# Patient Record
Sex: FEMALE | Race: WHITE | NOT HISPANIC OR LATINO | RURAL
[De-identification: names, ages, dates, MRNs, and addresses within clinical notes are randomized per-mention and may not be internally consistent; named-entity substitution may affect disease eponyms.]

---

## 2023-01-29 ENCOUNTER — TRANSCRIBE ORDERS (OUTPATIENT)
Dept: CARDIOLOGY | Facility: CLINIC | Age: 58
End: 2023-01-29

## 2023-01-29 DIAGNOSIS — I25.10 CORONARY ARTERY DISEASE, UNSPECIFIED VESSEL OR LESION TYPE, UNSPECIFIED WHETHER ANGINA PRESENT, UNSPECIFIED WHETHER NATIVE OR TRANSPLANTED HEART: Primary | ICD-10-CM

## 2023-07-26 ENCOUNTER — TELEPHONE (OUTPATIENT)
Dept: CARDIOLOGY | Facility: CLINIC | Age: 58
End: 2023-07-26

## 2023-07-26 NOTE — TELEPHONE ENCOUNTER
Caller: Debbie Guzman    Relationship: Self    Best call back number: [phone]     What is the best time to reach you: ANY    Who are you requesting to speak with (clinical staff, provider,  specific staff member): FRONT STAFF      What was the call regarding: PT IS NEEDING A CC APPT FOR HER COLONOSCOPY    Is it okay if the provider responds through MyChart: NO

## 2023-08-03 ENCOUNTER — OFFICE VISIT (OUTPATIENT)
Dept: CARDIOLOGY | Facility: CLINIC | Age: 58
End: 2023-08-03
Payer: MEDICARE

## 2023-08-03 VITALS
BODY MASS INDEX: 31.18 KG/M2 | OXYGEN SATURATION: 94 % | WEIGHT: 176 LBS | SYSTOLIC BLOOD PRESSURE: 134 MMHG | DIASTOLIC BLOOD PRESSURE: 68 MMHG | HEIGHT: 63 IN | HEART RATE: 77 BPM

## 2023-08-03 DIAGNOSIS — R06.02 SHORTNESS OF BREATH: ICD-10-CM

## 2023-08-03 DIAGNOSIS — G47.33 OBSTRUCTIVE SLEEP APNEA: Primary | ICD-10-CM

## 2023-08-03 DIAGNOSIS — I25.10 MILD CAD: ICD-10-CM

## 2023-08-03 RX ORDER — CYCLOBENZAPRINE HCL 10 MG
10 TABLET ORAL DAILY
COMMUNITY
Start: 2023-07-13

## 2023-08-03 RX ORDER — POLYETHYLENE GLYCOL-3350 AND ELECTROLYTES 236; 6.74; 5.86; 2.97; 22.74 G/274.31G; G/274.31G; G/274.31G; G/274.31G; G/274.31G
POWDER, FOR SOLUTION ORAL
COMMUNITY
Start: 2023-06-29

## 2023-08-03 RX ORDER — OMEPRAZOLE 40 MG/1
40 CAPSULE, DELAYED RELEASE ORAL DAILY
COMMUNITY
Start: 2023-07-13

## 2023-08-03 RX ORDER — GEMFIBROZIL 600 MG/1
1 TABLET, FILM COATED ORAL DAILY
COMMUNITY
Start: 2023-04-26

## 2023-08-03 RX ORDER — ATORVASTATIN CALCIUM 40 MG/1
1 TABLET, FILM COATED ORAL DAILY
COMMUNITY
Start: 2023-07-08

## 2023-08-03 RX ORDER — TIOTROPIUM BROMIDE 18 UG/1
CAPSULE ORAL; RESPIRATORY (INHALATION)
COMMUNITY
Start: 2023-07-13

## 2023-08-03 RX ORDER — ALBUTEROL SULFATE 90 UG/1
2 AEROSOL, METERED RESPIRATORY (INHALATION)
COMMUNITY

## 2023-08-03 RX ORDER — ASPIRIN 81 MG/1
81 TABLET, CHEWABLE ORAL DAILY
COMMUNITY

## 2023-08-03 RX ORDER — GABAPENTIN 400 MG/1
400 CAPSULE ORAL NIGHTLY
COMMUNITY
Start: 2023-07-25

## 2023-08-03 RX ORDER — TRAZODONE HYDROCHLORIDE 50 MG/1
50 TABLET ORAL NIGHTLY
COMMUNITY
Start: 2023-07-13

## 2023-08-03 RX ORDER — SEMAGLUTIDE 0.68 MG/ML
0.5 INJECTION, SOLUTION SUBCUTANEOUS WEEKLY
COMMUNITY
Start: 2023-07-19

## 2023-08-03 RX ORDER — SERTRALINE HYDROCHLORIDE 100 MG/1
1 TABLET, FILM COATED ORAL DAILY
COMMUNITY
Start: 2023-06-05

## 2023-08-15 ENCOUNTER — OUTSIDE FACILITY SERVICE (OUTPATIENT)
Dept: CARDIOLOGY | Facility: CLINIC | Age: 58
End: 2023-08-15
Payer: MEDICARE

## 2023-08-17 ENCOUNTER — OFFICE VISIT (OUTPATIENT)
Dept: CARDIOLOGY | Facility: CLINIC | Age: 58
End: 2023-08-17
Payer: MEDICARE

## 2023-08-17 VITALS
DIASTOLIC BLOOD PRESSURE: 60 MMHG | OXYGEN SATURATION: 91 % | WEIGHT: 174 LBS | HEIGHT: 63 IN | HEART RATE: 88 BPM | BODY MASS INDEX: 30.83 KG/M2 | SYSTOLIC BLOOD PRESSURE: 124 MMHG

## 2023-08-17 DIAGNOSIS — R93.1 ABNORMAL FINDINGS ON DIAGNOSTIC IMAGING OF HEART AND CORONARY CIRCULATION: ICD-10-CM

## 2023-08-17 DIAGNOSIS — R94.39 ABNORMAL NUCLEAR STRESS TEST: Primary | ICD-10-CM

## 2023-08-17 DIAGNOSIS — I25.10 MILD CAD: ICD-10-CM

## 2023-08-17 DIAGNOSIS — R06.02 SHORTNESS OF BREATH: ICD-10-CM

## 2023-08-17 PROCEDURE — 1160F RVW MEDS BY RX/DR IN RCRD: CPT | Performed by: NURSE PRACTITIONER

## 2023-08-17 PROCEDURE — 1159F MED LIST DOCD IN RCRD: CPT | Performed by: NURSE PRACTITIONER

## 2023-08-17 PROCEDURE — 99214 OFFICE O/P EST MOD 30 MIN: CPT | Performed by: NURSE PRACTITIONER

## 2023-08-17 RX ORDER — DICLOFENAC SODIUM 75 MG/1
TABLET, DELAYED RELEASE ORAL
COMMUNITY
Start: 2023-08-08 | End: 2023-08-17 | Stop reason: ALTCHOICE

## 2023-08-17 NOTE — ASSESSMENT & PLAN NOTE
Nuclear stress test 8/15/2023-evidence of anterior apical moderate-sized area of ischemia.  Intermediate risk study.  Discussed further cardiac testing including coronary CTA and left heart cath.  - Patient would like to proceed with a coronary CTA at this time.

## 2023-08-17 NOTE — ASSESSMENT & PLAN NOTE
Abnormal nuclear stress test  - Coronary CTA for further evaluation  - Continue aspirin and atorvastatin at current doses.

## 2023-08-17 NOTE — PROGRESS NOTES
[facility] Provider Note     Date:   2023   Name: Debbie Guzman  :   1965  PCP: Nicolas Duarte MD    Chief Complaint   Patient presents with    Follow-up       Subjective     History of Present Illness  Debbie Guzman is a 58 y.o. female who presents today for follow-up on recent cardiac testing.  Patient was evaluated on 8/3/2023 for KEZIA and mild CAD.  She was also needing cardiac clearance prior to routine colonoscopy. She has a history of mild CAD with no previous stenting. Last LHC was in  that revealed mild coronary artery atherosclerosis. A nuclear stress test and echocardiogram were ordered at last office visit due to shortness of breath.  Nuclear stress test completed on 8/15/2023 shows evidence of anterior apical moderate-sized area of ischemia, intermediate risk study.  Echocardiogram from 2023 revealed LVEF of 64%.  Mild to moderate concentric hypertrophy and a small (less than 1 cm) pericardial effusion adjacent to the right atrium and right ventricle, there is no evidence of cardiac tamponade.  Patient denies any new symptoms since last office visit.  She denies chest pain, palpitations, dizziness or syncope.  She continues to have shortness of breath.  History of KEZIA with baseline AHI of 24, currently on BiPAP therapy with good control and compliance.  Download was reviewed and interpreted at last office visit.    Cardiac/sleep history  1.  Mild CAD-no previous stenting.  2.  KEZIA with baseline AHI of 24 on BiPAP therapy 22/14 cm  3.  Hyperlipidemia    Nuclear stress test 8/15/2023-evidence of anterior apical moderate-sized area of ischemia.  Intermediate risk study.    Echocardiogram 2023-  ú  Left ventricular systolic function is normal. Calculated left ventricular EF = 64% Left ventricular ejection fraction appears to be 61 - 65%.  ú  Left ventricular wall thickness is consistent with mild to moderate concentric hypertrophy.  ú  Left  ventricular diastolic function is consistent with (grade I) impaired relaxation.  ú  The right ventricular cavity is mildly dilated.  ú  There is a small (<1cm) pericardial effusion adjacent to the right atrium and right ventricle. There is no evidence of cardiac tamponade.      [facility] 8/27/2021-angiographically, the patient has mild coronary artery atherosclerosis.  There is no indication for revascularization.     Reports Denies   Chest Pain [] [x]   Shortness of Air [x] []   Palpitations [] [x]   Edema [] [x]   Dizziness [] [x]   Syncope [] [x]       Allergies   Allergen Reactions    Codeine Unknown - Low Severity       Current Outpatient Medications:     albuterol sulfate  (90 Base) MCG/ACT inhaler, Inhale 2 puffs., Disp: , Rfl:     aspirin 81 MG chewable tablet, Chew 1 tablet Daily., Disp: , Rfl:     atorvastatin (LIPITOR) 40 MG tablet, Take 1 tablet by mouth Daily., Disp: , Rfl:     cyclobenzaprine (FLEXERIL) 10 MG tablet, Take 1 tablet by mouth Daily., Disp: , Rfl:     gabapentin (NEURONTIN) 400 MG capsule, Take 1 capsule by mouth Every Night., Disp: , Rfl:     GaviLyte-G 236 g solution, TAKE 4000 ML BY MOUTH AS DIRECTED FOR 1 DAY, Disp: , Rfl:     gemfibrozil (LOPID) 600 MG tablet, Take 1 tablet by mouth Daily., Disp: , Rfl:     metFORMIN (GLUCOPHAGE) 500 MG tablet, Take 2 tablets by mouth Every 12 (Twelve) Hours., Disp: , Rfl:     omeprazole (priLOSEC) 40 MG capsule, Take 1 capsule by mouth Daily., Disp: , Rfl:     Ozempic, 0.25 or 0.5 MG/DOSE, 2 MG/3ML solution pen-injector, Inject 0.5 mg under the skin into the appropriate area as directed 1 (One) Time Per Week., Disp: , Rfl:     sertraline (ZOLOFT) 100 MG tablet, Take 1 tablet by mouth Daily., Disp: , Rfl:     Spiriva HandiHaler 18 MCG per inhalation capsule, Place  into inhaler and inhale Daily., Disp: , Rfl:     traZODone (DESYREL) 50 MG tablet, Take 1 tablet by mouth Every Night., Disp: , Rfl:     Past Medical History:   Diagnosis  "Date    COPD (chronic obstructive pulmonary disease)     Coronary artery disease     30% mid LAD per cath    Diabetes     Hyperlipidemia     Neuropathy     Sleep apnea     baseline AHI 24      Past Surgical History:   Procedure Laterality Date    BACK SURGERY      CERVICAL FUSION       SECTION      x two    CHOLECYSTECTOMY      HYSTERECTOMY      KNEE ARTHROSCOPY Bilateral     SHOULDER SURGERY Bilateral      History reviewed. No pertinent family history.  Social History     Socioeconomic History    Marital status: Single    Number of children: 2   Tobacco Use    Smoking status: Every Day     Packs/day: 1.00     Years: 30.00     Pack years: 30.00     Types: Cigarettes     Passive exposure: Current    Smokeless tobacco: Never   Vaping Use    Vaping Use: Never used   Substance and Sexual Activity    Alcohol use: Never    Drug use: Never       Objective     Vital Signs:  /60   Pulse 88   Ht 160 cm (63\")   Wt 78.9 kg (174 lb)   SpO2 91%   BMI 30.82 kg/mý   Estimated body mass index is 30.82 kg/mý as calculated from the following:    Height as of this encounter: 160 cm (63\").    Weight as of this encounter: 78.9 kg (174 lb).               Physical Exam  Vitals reviewed.   Constitutional:       Appearance: Normal appearance.   HENT:      Head: Normocephalic.   Cardiovascular:      Rate and Rhythm: Normal rate and regular rhythm.      Heart sounds: Normal heart sounds.   Pulmonary:      Effort: Pulmonary effort is normal.      Breath sounds: Normal breath sounds.   Musculoskeletal:      Right lower leg: No edema.      Left lower leg: No edema.   Skin:     General: Skin is warm and dry.      Capillary Refill: Capillary refill takes less than 2 seconds.   Neurological:      General: No focal deficit present.      Mental Status: She is alert and oriented to person, place, and time.   Psychiatric:         Mood and Affect: Mood normal.         Behavior: Behavior normal. "         Cardiology studies reviewed: Nuclear stress test and echocardiogram reviewed.          Assessment and Plan     Diagnoses and all orders for this visit:    1. Abnormal nuclear stress test (Primary)  Assessment & Plan:  Nuclear stress test 8/15/2023-evidence of anterior apical moderate-sized area of ischemia.  Intermediate risk study.  Discussed further cardiac testing including coronary CTA and left heart cath.  - Patient would like to proceed with a coronary CTA at this time.    Orders:  -     CT Coronary FFR CTA Data RADHA & GNRJ Estimated FFR Model; Future    2. Shortness of breath  Assessment & Plan:  Likely multifactorial, patient is a smoker and has mild CAD.    Orders:  -     CT Coronary FFR CTA Data RADHA & GNRJ Estimated FFR Model; Future    3. Mild CAD  Assessment & Plan:  Abnormal nuclear stress test  - Coronary CTA for further evaluation  - Continue aspirin and atorvastatin at current doses.      4. Abnormal findings on diagnostic imaging of heart and coronary circulation  -     CT Coronary FFR CTA Data RADHA & GNRJ Estimated FFR Model; Future        Recommendations: ER if symptoms increase, Report if any new/changing symptoms immediately, and Limitations of stress testing for definitive diagnosis reviewed          Follow Up  Return in about 4 weeks (around 9/14/2023) for cornary CTA result.  Patient was given instructions and counseling regarding her condition or for health maintenance advice. Please see specific information pulled into the AVS if appropriate.

## 2023-08-21 DIAGNOSIS — R06.02 SHORTNESS OF BREATH: ICD-10-CM

## 2023-08-21 DIAGNOSIS — I25.10 MILD CAD: ICD-10-CM

## 2023-08-30 ENCOUNTER — TELEPHONE (OUTPATIENT)
Dept: CARDIOLOGY | Facility: CLINIC | Age: 58
End: 2023-08-30
Payer: MEDICARE

## 2023-08-30 DIAGNOSIS — G47.33 OBSTRUCTIVE SLEEP APNEA: Primary | ICD-10-CM

## 2023-08-30 NOTE — TELEPHONE ENCOUNTER
Caller: Mena Guzman    Relationship: Self    Best call back number: [phone] (home)     What is the best time to reach you: ANYTIME    Who are you requesting to speak with (clinical staff, provider,  specific staff member): CLINICAL STAFF     Do you know the name of the person who called: MENA GUZMAN    What was the call regarding: PATIENT WAS CALLED BY XIOMARA. XIOMARA WAS TOLD BY THIS OFFICE THAT THE PATIENT IS NOT ON OXYGEN AT NIGHT. THE PATIENT STATES THAT SHE HAS BEEN ON OXYGEN AT NIGHT FOR ABOUT 4-5 YEARS. THE PATIENT WOULD LIKE FOR THIS TO BE ADDRESSED AND SHE WOULD LIKE A CALL BACK.

## 2023-08-30 NOTE — TELEPHONE ENCOUNTER
Called [facility] to clarify what exactly is needed.  Checked in QRS and pt has been on two liters of oxygen through her PAP device since at least 8/15/2017.  Last order in QRS for BIPAP with oxygen was done by Laura Whitaker on 8/21/2022.  Verified with [facility]'s that pt is currently on BIPAP 22/14 with two liters oxygen bled in.  They need office note and new orders sent in.

## 2023-09-07 ENCOUNTER — TELEPHONE (OUTPATIENT)
Dept: CARDIOLOGY | Facility: CLINIC | Age: 58
End: 2023-09-07
Payer: MEDICARE

## 2023-09-07 NOTE — TELEPHONE ENCOUNTER
Can you addend this patient's most recent note stating that the patient needs to continue her usage of O2?  Denise calles [facility]'s is needing it for insurance requirements.  Thanks

## 2023-09-07 NOTE — TELEPHONE ENCOUNTER
Caller: Debbie Guzman    Relationship: Self    Best call back number: [phone]    What is the best time to reach you: ANY    Who are you requesting to speak with (clinical staff, provider,  specific staff member): CLINICAL      What was the call regarding: PT HAD TO CANCEL HER UPCOMING APPT DUE TO EYE SURGERY, BUT, THAT APPT WAS FOR A FOLLOW UP FOR A CTA SHE WAS SUPPOSE TO HAVE DONE. SHE HAS NEVER GOTTEN A CALL TO GET THAT SCHEDULED AND THUS HAS NOT HAD THE TESTING FOR A NEEDED FOLLOW UP. SHE WOULD LIKE TO KNOW WHAT IS GOING ON WITH THAT SCHEDULING FOR THE CTA

## 2023-09-07 NOTE — TELEPHONE ENCOUNTER
Pt relates she is awaiting a call back from  to see if her insurance will approve.  She will call us back if she does not hear back from them.  She is given their phone number [phone].  She verbalizes understanding.

## 2023-09-25 ENCOUNTER — TELEPHONE (OUTPATIENT)
Dept: CARDIOLOGY | Facility: CLINIC | Age: 58
End: 2023-09-25

## 2023-09-25 NOTE — TELEPHONE ENCOUNTER
Left voicemail for patient to call back to schedule appointment for CTA results for next week with Clara. HUB ok to read/schedule appt if patient calls back.

## 2023-09-26 DIAGNOSIS — R06.02 SHORTNESS OF BREATH: ICD-10-CM

## 2023-09-26 DIAGNOSIS — R93.1 ABNORMAL FINDINGS ON DIAGNOSTIC IMAGING OF HEART AND CORONARY CIRCULATION: ICD-10-CM

## 2023-09-26 DIAGNOSIS — R94.39 ABNORMAL NUCLEAR STRESS TEST: ICD-10-CM

## 2023-09-26 NOTE — TELEPHONE ENCOUNTER
Name: Alexandro Guzmancie  Relationship: Self  Best Callback Number: [phone]  [facility] PROVIDED THE RELAY MESSAGE FROM THE OFFICE  SCHEDULED PATIENT FOLLOW UP WITH BOOGIE NEXT WEEK

## 2023-10-02 ENCOUNTER — OFFICE VISIT (OUTPATIENT)
Dept: CARDIOLOGY | Facility: CLINIC | Age: 58
End: 2023-10-02
Payer: MEDICARE

## 2023-10-02 VITALS
OXYGEN SATURATION: 95 % | DIASTOLIC BLOOD PRESSURE: 68 MMHG | BODY MASS INDEX: 31.18 KG/M2 | WEIGHT: 176 LBS | SYSTOLIC BLOOD PRESSURE: 126 MMHG | HEART RATE: 89 BPM | HEIGHT: 63 IN

## 2023-10-02 DIAGNOSIS — I25.10 MILD CAD: Primary | ICD-10-CM

## 2023-10-02 DIAGNOSIS — G47.33 OBSTRUCTIVE SLEEP APNEA: ICD-10-CM

## 2023-10-02 PROBLEM — E11.9 TYPE 2 DIABETES MELLITUS WITHOUT COMPLICATIONS: Status: ACTIVE | Noted: 2022-12-08

## 2023-10-02 PROBLEM — Z99.81 DEPENDENCE ON SUPPLEMENTAL OXYGEN: Status: ACTIVE | Noted: 2022-12-08

## 2023-10-02 PROBLEM — F17.210 NICOTINE DEPENDENCE, CIGARETTES, UNCOMPLICATED: Status: ACTIVE | Noted: 2022-12-08

## 2023-10-02 PROBLEM — F32.A DEPRESSION, UNSPECIFIED: Status: ACTIVE | Noted: 2022-12-08

## 2023-10-02 PROBLEM — Z96.642 PRESENCE OF LEFT ARTIFICIAL HIP JOINT: Status: ACTIVE | Noted: 2022-12-08

## 2023-10-02 PROBLEM — Z79.4 LONG TERM (CURRENT) USE OF INSULIN: Status: ACTIVE | Noted: 2022-12-08

## 2023-10-02 PROBLEM — J44.9 CHRONIC OBSTRUCTIVE PULMONARY DISEASE, UNSPECIFIED: Status: ACTIVE | Noted: 2022-12-08

## 2023-10-02 PROBLEM — F41.9 ANXIETY DISORDER, UNSPECIFIED: Status: ACTIVE | Noted: 2022-12-08

## 2023-10-02 PROCEDURE — 1159F MED LIST DOCD IN RCRD: CPT | Performed by: NURSE PRACTITIONER

## 2023-10-02 PROCEDURE — 99214 OFFICE O/P EST MOD 30 MIN: CPT | Performed by: NURSE PRACTITIONER

## 2023-10-02 PROCEDURE — 1160F RVW MEDS BY RX/DR IN RCRD: CPT | Performed by: NURSE PRACTITIONER

## 2023-10-02 RX ORDER — CYCLOPENTOLATE HYDROCHLORIDE 10 MG/ML
SOLUTION/ DROPS OPHTHALMIC
COMMUNITY
Start: 2023-08-18

## 2023-10-02 NOTE — ASSESSMENT & PLAN NOTE
Patient is doing very well on BIPAP therapy with good control and compliance.  Download was reviewed at last office visit.  She is benefiting from PAP therapy and we will continue at current settings.

## 2023-10-02 NOTE — PROGRESS NOTES
[facility] Provider Note     Date:   10/02/2023   Name: Debbie Guzman  :   1965  PCP: Nicolas Duarte MD    Chief Complaint   Patient presents with    Chest Pain     Study Results       Subjective     History of Present Illness  Debbie Guzman is a 58 y.o. female who presents today for follow-up on recent coronary CTA.  She has a history of KEZIA with baseline AHI of 24 currently on BiPAP therapy, download was reviewed at last office visit with good control and compliance.  Patient completed a nuclear stress test on 8/15/2023 that revealed evidence of anteroapical moderate-sized area of ischemia, intermediate risk study.  A coronary CTA was completed on 2023 that revealed severe coronary calcification with Agatston score of 307.  Moderate stenosis noted in the proximal segment of the RCA from noncalcified plaque.  Otherwise nonobstructive epicardial coronary arteries.  Results reviewed in detail with patient.  She denies any current symptoms at this time.  She continues to smoke cigarettes and is not ready to quit at this time.  We discussed preventative measures, including controlling cholesterol, smoking cessation and controlling her blood sugar.    Cardiac/sleep history  1.  Mild CAD-no previous stenting.  2.  KEZIA with baseline AHI of 24 on BiPAP therapy 22/14 cm  3.  Hyperlipidemia    Coronary CTA 23-  1. Severe coronary calcification with an Agatston score = 307 using the AJ-130 method, which represents 97 percentile when matched for age, gender and ethnicity.  Vascular age is 80 years.     2. Moderate stenosis in the proximal segment of the right coronary artery from noncalcified plaque. Otherwise nonobstructive epicardial coronary arteries       CT angio FFR 23- The moderate stenosis in the proximal segment of the right coronary artery has a Low likelihood of flow-limitation with an FFRct value of 0.94.    Nuclear stress test 8/15/2023-evidence of  anterior apical moderate-sized area of ischemia.  Intermediate risk study.    Echocardiogram 8/8/2023-  ·  Left ventricular systolic function is normal. Calculated left ventricular EF = 64% Left ventricular ejection fraction appears to be 61 - 65%.  ·  Left ventricular wall thickness is consistent with mild to moderate concentric hypertrophy.  ·  Left ventricular diastolic function is consistent with (grade I) impaired relaxation.  ·  The right ventricular cavity is mildly dilated.  ·  There is a small (<1cm) pericardial effusion adjacent to the right atrium and right ventricle. There is no evidence of cardiac tamponade.      [facility] 8/27/2021-angiographically, the patient has mild coronary artery atherosclerosis.  There is no indication for revascularization.        Allergies   Allergen Reactions    Codeine Unknown - Low Severity       Current Outpatient Medications:     albuterol sulfate  (90 Base) MCG/ACT inhaler, Inhale 2 puffs., Disp: , Rfl:     aspirin 81 MG chewable tablet, Chew 1 tablet Daily., Disp: , Rfl:     atorvastatin (LIPITOR) 40 MG tablet, Take 1 tablet by mouth Daily., Disp: , Rfl:     cyclobenzaprine (FLEXERIL) 10 MG tablet, Take 1 tablet by mouth Daily., Disp: , Rfl:     cyclopentolate (CYCLOGYL) 1 % ophthalmic solution, PLEASE SEE ATTACHED FOR DETAILED DIRECTIONS, Disp: , Rfl:     gabapentin (NEURONTIN) 400 MG capsule, Take 1 capsule by mouth Every Night., Disp: , Rfl:     GaviLyte-G 236 g solution, TAKE 4000 ML BY MOUTH AS DIRECTED FOR 1 DAY, Disp: , Rfl:     gemfibrozil (LOPID) 600 MG tablet, Take 1 tablet by mouth Daily., Disp: , Rfl:     metFORMIN (GLUCOPHAGE) 500 MG tablet, Take 2 tablets by mouth Every 12 (Twelve) Hours., Disp: , Rfl:     omeprazole (priLOSEC) 40 MG capsule, Take 1 capsule by mouth Daily., Disp: , Rfl:     Ozempic, 0.25 or 0.5 MG/DOSE, 2 MG/3ML solution pen-injector, Inject 0.5 mg under the skin into the appropriate area as directed 1 (One) Time Per Week., Disp: , Rfl:  "    sertraline (ZOLOFT) 100 MG tablet, Take 1 tablet by mouth Daily., Disp: , Rfl:     Spiriva HandiHaler 18 MCG per inhalation capsule, Place  into inhaler and inhale Daily., Disp: , Rfl:     traZODone (DESYREL) 50 MG tablet, Take 1 tablet by mouth Every Night., Disp: , Rfl:     Past Medical History:   Diagnosis Date    COPD (chronic obstructive pulmonary disease)     Coronary artery disease     30% mid LAD per cath    Diabetes     Hyperlipidemia     Neuropathy     Sleep apnea     baseline AHI 24      Past Surgical History:   Procedure Laterality Date    BACK SURGERY      CERVICAL FUSION       SECTION      x two    CHOLECYSTECTOMY      HYSTERECTOMY      KNEE ARTHROSCOPY Bilateral     SHOULDER SURGERY Bilateral      History reviewed. No pertinent family history.  Social History     Socioeconomic History    Marital status: Single    Number of children: 2   Tobacco Use    Smoking status: Every Day     Packs/day: 1.00     Years: 30.00     Pack years: 30.00     Types: Cigarettes     Passive exposure: Current    Smokeless tobacco: Never   Vaping Use    Vaping Use: Never used   Substance and Sexual Activity    Alcohol use: Never    Drug use: Never    Sexual activity: Defer       Objective     Vital Signs:  /68   Pulse 89   Ht 160 cm (63\")   Wt 79.8 kg (176 lb)   SpO2 95%   BMI 31.18 kg/m²   Estimated body mass index is 31.18 kg/m² as calculated from the following:    Height as of this encounter: 160 cm (63\").    Weight as of this encounter: 79.8 kg (176 lb).               Physical Exam  Vitals reviewed.   Constitutional:       Appearance: Normal appearance.   HENT:      Head: Normocephalic.   Cardiovascular:      Rate and Rhythm: Normal rate and regular rhythm.      Heart sounds: Normal heart sounds.   Pulmonary:      Effort: Pulmonary effort is normal.      Breath sounds: Normal breath sounds.   Musculoskeletal:      Right lower leg: No edema.      Left lower leg: No edema.   Skin:     General: " Skin is warm and dry.      Capillary Refill: Capillary refill takes less than 2 seconds.   Neurological:      General: No focal deficit present.      Mental Status: She is alert and oriented to person, place, and time.   Psychiatric:         Mood and Affect: Mood normal.         Behavior: Behavior normal.         Cardiology studies reviewed: Coronary CTA reviewed          Assessment and Plan     Diagnoses and all orders for this visit:    1. Mild CAD (Primary)  Assessment & Plan:  Coronary CTA from 9/20/23 revealed severe coronary calcification with Agatston score of 307.  Moderate stenosis noted in the proximal segment of the RCA from noncalcified plaque.  Otherwise nonobstructive epicardial coronary arteries.    CT angio FFR 9/20/23- The moderate stenosis in the proximal segment of the right coronary artery has a Low likelihood of flow-limitation with an FFRct value of 0.94    -We discussed preventative measures, including management of cholesterol, smoking cessation and controlling blood sugar  - Highly encouraged smoking cessation the patient is not ready to quit at this time.  - Continue aspirin, atorvastatin and gemfibrozil and at current doses.  -She will need a lipid panel at next office visit if not done with PCP prior.      2. Obstructive sleep apnea  Assessment & Plan:  Patient is doing very well on BIPAP therapy with good control and compliance.  Download was reviewed at last office visit.  She is benefiting from PAP therapy and we will continue at current settings.          Recommendations: Report if any new/changing symptoms immediately          Follow Up  Return in about 6 months (around 4/2/2024) for KEZIA/CAD.  Patient was given instructions and counseling regarding her condition or for health maintenance advice. Please see specific information pulled into the AVS if appropriate.

## 2023-10-02 NOTE — ASSESSMENT & PLAN NOTE
Coronary CTA from 9/20/23 revealed severe coronary calcification with Agatston score of 307.  Moderate stenosis noted in the proximal segment of the RCA from noncalcified plaque.  Otherwise nonobstructive epicardial coronary arteries.    CT angio FFR 9/20/23- The moderate stenosis in the proximal segment of the right coronary artery has a Low likelihood of flow-limitation with an FFRct value of 0.94    -We discussed preventative measures, including management of cholesterol, smoking cessation and controlling blood sugar  - Highly encouraged smoking cessation the patient is not ready to quit at this time.  - Continue aspirin, atorvastatin and gemfibrozil and at current doses.  -She will need a lipid panel at next office visit if not done with PCP prior.

## 2024-01-02 ENCOUNTER — TELEPHONE (OUTPATIENT)
Dept: CARDIOLOGY | Facility: CLINIC | Age: 59
End: 2024-01-02
Payer: MEDICARE

## 2024-01-02 NOTE — TELEPHONE ENCOUNTER
Patient called in wanting to know if we could send in Neb supplies and Rx for solution to Pharmacy on file and supplies.Please advise I did ask if she had contacted PCP stated they told her to call us.

## 2024-01-02 NOTE — TELEPHONE ENCOUNTER
Call the patient and get a bit more information.  If she is smoking?  Is she coughing?  Is you wheezing?  If yes this sounds like a shortness of air call.  If she is using her inhaler and she is still short of air then this may be more of an asthma or COPD problem and if that is true then she needs to contact her PCP or lung specialist.

## 2024-01-02 NOTE — TELEPHONE ENCOUNTER
Called patient back she uses it for when she is SOA  she has inhaler but not working. Stated that if we send in to send supplies to [facility] and Rx to Pharmacy on file

## 2024-01-03 NOTE — TELEPHONE ENCOUNTER
Spoke with patient and she is a smoker, she's coughing/wheezing. She went to PCP and they gave her antibotics, she seems to think that's helping it break up a little bit. I advised if it got worse to go see PCP or Lung specialist. She verbally understood. Thanks.

## 2024-04-10 ENCOUNTER — OFFICE VISIT (OUTPATIENT)
Dept: CARDIOLOGY | Facility: CLINIC | Age: 59
End: 2024-04-10
Payer: MEDICARE

## 2024-04-10 VITALS
WEIGHT: 165 LBS | HEIGHT: 63 IN | DIASTOLIC BLOOD PRESSURE: 88 MMHG | BODY MASS INDEX: 29.23 KG/M2 | OXYGEN SATURATION: 95 % | HEART RATE: 89 BPM | SYSTOLIC BLOOD PRESSURE: 158 MMHG

## 2024-04-10 DIAGNOSIS — G47.33 OBSTRUCTIVE SLEEP APNEA: ICD-10-CM

## 2024-04-10 DIAGNOSIS — E78.2 MIXED HYPERLIPIDEMIA: ICD-10-CM

## 2024-04-10 DIAGNOSIS — R03.0 ELEVATED BLOOD PRESSURE READING: ICD-10-CM

## 2024-04-10 DIAGNOSIS — I25.10 MILD CAD: ICD-10-CM

## 2024-04-10 PROCEDURE — 99213 OFFICE O/P EST LOW 20 MIN: CPT | Performed by: NURSE PRACTITIONER

## 2024-04-10 RX ORDER — CETIRIZINE HYDROCHLORIDE 10 MG/1
TABLET ORAL
COMMUNITY
Start: 2024-03-22

## 2024-04-10 RX ORDER — GABAPENTIN 600 MG/1
TABLET ORAL
COMMUNITY
Start: 2024-04-05

## 2024-04-10 RX ORDER — DICLOFENAC SODIUM 75 MG/1
TABLET, DELAYED RELEASE ORAL
COMMUNITY

## 2024-04-10 NOTE — PROGRESS NOTES
[facility] Provider Note     Date:   04/10/2024   Name: Debbie Guzman  :   1965  PCP: Nicolas Duarte MD    Chief Complaint   Patient presents with    Follow-up    Sleep Apnea    Coronary Artery Disease       Subjective     History of Present Illness  Debbie Guzman is a 59 y.o. female who presents today for follow-up on CAD and KEZIA.  She reports her machine, airflow, mask are comfortable and working well.  We reviewed her recent PAP compliance report and she is benefiting from PAP therapy.  We will plan to continue.    She has been having some tingling in her left arm for the last 2 to 3 weeks but she has a cyst under her left arm she is going to see a surgeon today -Dr. Gallo allan -and will be having a biopsy on Monday.  Her blood pressure is elevated in clinic today.  She feels this is related to the pain in her arm from the cyst.  I would like to see her back in 3 months to see if after the cyst and pain resolve if her blood pressure goes back down.    She is on Ozempic and she said her H A1c has greatly improved and that she is losing weight with it.    Reviewed 4/10/2024 labs from Dr. Duarte.  Potassium 4.5, creatinine 0.7, EGFR 91, triglycerides 239, cholesterol 136, LDL 44, hemoglobin A1c 5.9.  Her LDL is at goal.        Cardiac/sleep history  1.  Mild CAD-no previous stenting.  2.  KEZIA with baseline AHI of 24 on BiPAP therapy 22/14 cm  3.  Hyperlipidemia    DME: [facility]  He[facility]ing    Coronary CTA 23-  1. Severe coronary calcification with an Agatston score = 307 using the AJ-130 method, which represents 97 percentile when matched for age, gender and ethnicity.  Vascular age is 80 years.     2. Moderate stenosis in the proximal segment of the right coronary artery from noncalcified plaque. Otherwise nonobstructive epicardial coronary arteries       CT angio FFR 23- The moderate stenosis in the proximal segment of the right coronary artery  has a Low likelihood of flow-limitation with an FFRct value of 0.94.    Nuclear stress test 8/15/2023-evidence of anterior apical moderate-sized area of ischemia.  Intermediate risk study.    Echocardiogram 8/8/2023-  ·  Left ventricular systolic function is normal. Calculated left ventricular EF = 64% Left ventricular ejection fraction appears to be 61 - 65%.  ·  Left ventricular wall thickness is consistent with mild to moderate concentric hypertrophy.  ·  Left ventricular diastolic function is consistent with (grade I) impaired relaxation.  ·  The right ventricular cavity is mildly dilated.  ·  There is a small (<1cm) pericardial effusion adjacent to the right atrium and right ventricle. There is no evidence of cardiac tamponade.      [facility] 8/27/2021-angiographically, the patient has mild coronary artery atherosclerosis.  There is no indication for revascularization.    Allergies   Allergen Reactions    Codeine Unknown - Low Severity       Current Outpatient Medications:     albuterol sulfate  (90 Base) MCG/ACT inhaler, Inhale 2 puffs., Disp: , Rfl:     aspirin 81 MG chewable tablet, Chew 1 tablet Daily., Disp: , Rfl:     atorvastatin (LIPITOR) 40 MG tablet, Take 1 tablet by mouth Daily., Disp: , Rfl:     cetirizine (zyrTEC) 10 MG tablet, Take 1 tablet every day by oral route as directed for 90 days., Disp: , Rfl:     cyclobenzaprine (FLEXERIL) 10 MG tablet, Take 1 tablet by mouth Daily., Disp: , Rfl:     cyclopentolate (CYCLOGYL) 1 % ophthalmic solution, PLEASE SEE ATTACHED FOR DETAILED DIRECTIONS, Disp: , Rfl:     diclofenac (VOLTAREN) 75 MG EC tablet, take 1 tablet by mouth twice daily with food for 30 day(s), Disp: , Rfl:     gabapentin (NEURONTIN) 600 MG tablet, , Disp: , Rfl:     gemfibrozil (LOPID) 600 MG tablet, Take 1 tablet by mouth Daily., Disp: , Rfl:     metFORMIN (GLUCOPHAGE) 500 MG tablet, Take 2 tablets by mouth Every 12 (Twelve) Hours., Disp: , Rfl:     omeprazole (priLOSEC) 40 MG capsule,  "Take 1 capsule by mouth Daily., Disp: , Rfl:     Ozempic, 0.25 or 0.5 MG/DOSE, 2 MG/3ML solution pen-injector, Inject 0.5 mg under the skin into the appropriate area as directed 1 (One) Time Per Week., Disp: , Rfl:     sertraline (ZOLOFT) 100 MG tablet, Take 1 tablet by mouth Daily., Disp: , Rfl:     Spiriva HandiHaler 18 MCG per inhalation capsule, Place  into inhaler and inhale Daily., Disp: , Rfl:     traZODone (DESYREL) 50 MG tablet, Take 1 tablet by mouth Every Night., Disp: , Rfl:     Past Medical History:   Diagnosis Date    COPD (chronic obstructive pulmonary disease)     Coronary artery disease     30% mid LAD per cath    Diabetes     Hyperlipidemia     Neuropathy     Sleep apnea     baseline AHI 24      Past Surgical History:   Procedure Laterality Date    BACK SURGERY      CERVICAL FUSION       SECTION      x two    CHOLECYSTECTOMY      HYSTERECTOMY      KNEE ARTHROSCOPY Bilateral     SHOULDER SURGERY Bilateral      History reviewed. No pertinent family history.  Social History     Socioeconomic History    Marital status: Single    Number of children: 2   Tobacco Use    Smoking status: Every Day     Current packs/day: 1.00     Average packs/day: 1 pack/day for 30.0 years (30.0 ttl pk-yrs)     Types: Cigarettes     Passive exposure: Current    Smokeless tobacco: Never   Vaping Use    Vaping status: Never Used   Substance and Sexual Activity    Alcohol use: Never    Drug use: Never    Sexual activity: Defer       Objective     Vital Signs:  /88   Pulse 89   Ht 160 cm (63\")   Wt 74.8 kg (165 lb)   SpO2 95%   BMI 29.23 kg/m²   Estimated body mass index is 29.23 kg/m² as calculated from the following:    Height as of this encounter: 160 cm (63\").    Weight as of this encounter: 74.8 kg (165 lb).         Physical Exam  Vitals reviewed.   Constitutional:       Appearance: Normal appearance. She is well-developed.   HENT:      Head: Normocephalic and atraumatic.   Eyes:      General: No " scleral icterus.     Pupils: Pupils are equal, round, and reactive to light.   Neck:      Vascular: No carotid bruit.   Cardiovascular:      Rate and Rhythm: Normal rate and regular rhythm.      Pulses: Normal pulses.           Radial pulses are 2+ on the right side and 2+ on the left side.        Dorsalis pedis pulses are 2+ on the right side and 2+ on the left side.        Posterior tibial pulses are 2+ on the right side and 2+ on the left side.      Heart sounds: Normal heart sounds. No murmur heard.  Pulmonary:      Breath sounds: Normal breath sounds. No wheezing or rhonchi.   Musculoskeletal:         General: Normal range of motion.      Right lower leg: No edema.      Left lower leg: No edema.   Skin:     General: Skin is warm and dry.      Capillary Refill: Capillary refill takes less than 2 seconds.      Coloration: Skin is not cyanotic.      Nails: There is no clubbing.   Neurological:      Mental Status: She is alert and oriented to person, place, and time.      Motor: No weakness.      Gait: Gait normal.   Psychiatric:         Mood and Affect: Mood normal.         Behavior: Behavior is cooperative.         Thought Content: Thought content normal.         Cognition and Memory: Memory normal.                     Assessment and Plan     Diagnoses and all orders for this visit:    1. Elevated blood pressure reading  Comments:  Pain?  Recheck at follow-up.    2. Mild CAD  Comments:  Stable.  No chest pain.    3. Obstructive sleep apnea  Comments:  Benefiting from PAP therapy.  Plan to continue.  Orders:  -     PAP Therapy    4. Mixed hyperlipidemia  Comments:  4/10/2024 LDL 44.  Continue atorvastatin 40 mg and Lopid 600 mg.    Other orders  -     Cancel: Lipid Panel; Future        Recommendations: ER if symptoms increase and Report if any new/changing symptoms immediately      Follow Up  Return in about 3 months (around 7/10/2024) for KEZIA/CAD/HPL.    Leora Montgomery, JEAN PIERRE   04/10/2024     Please note that  this explicitly excludes time spent on other separate billable services such as performing procedures or test interpretation, when applicable.    This note was created using dictation software which occasionally transcribes nonsensical phrases. Please contact the provider if any clarification is needed.

## 2024-04-17 ENCOUNTER — TELEPHONE (OUTPATIENT)
Dept: CARDIOLOGY | Facility: CLINIC | Age: 59
End: 2024-04-17
Payer: MEDICARE

## 2024-04-17 NOTE — LETTER
Cardiac Risk Assessment Review        Patient Name: Debbie Guzman  Patient :   1965  Surgical Procedure: Colonoscopy  Date of clearance: 2024  Date of last office visit: 4/10/2024   Procedure/Test Performed Date   [x] CTA 2023   [x] Echocardiogram 2023   [x] EKG 2023   [x] Heart Cath      Based on the above test results and/or clinical evaluation, it is my recommendation:    [x] Patient has an acceptable cardiac risk for the procedure as planned.    [x] Patient CAN stop aspirin 7 days prior to the procedure.       [x] The patient has obstructive sleep apnea and/or central sleep apnea, and appropriate anesthesia precautions should be taken.       If you have any questions, please call our office at [phone].    Thank you,      Hellen Velazquez MD   [facility] [place]

## 2024-04-17 NOTE — TELEPHONE ENCOUNTER
REQUEST FOR CARDIAC CLEARANCE    Caller name: Debbie Guzman     Phone Number: [phone]     Surgeon's name: DR. PEREZ     Type of planned surgery: colonoscopy    Date of planned surgery: 4/24/24     Type of anesthesia: GENERAL     Have you been experiencing chest pain or shortness of breath? UNKNOWN     Is your doctor requesting for you to stop any of your medications prior to your surgery? BLOOD THINNERS/ ASPIRIN     Where should we fax the clearance to? [phone]

## 2024-07-09 ENCOUNTER — TELEPHONE (OUTPATIENT)
Dept: CARDIOLOGY | Facility: CLINIC | Age: 59
End: 2024-07-09
Payer: MEDICARE

## 2024-07-09 NOTE — TELEPHONE ENCOUNTER
Rob left VM from [facility] regarding cardiac clearance on this patient.  Patient has appt scheduled Wednesday, 7/10 with [facility].  Rob is requesting an EKG and patient is also needing BMP prior to surgery.  Patient has requested that labs be done here due to patient not wanting to go all the way back to [place] for labs.    [facility]:  PH: [phone]  FX: [phone]

## 2024-07-10 ENCOUNTER — OFFICE VISIT (OUTPATIENT)
Dept: CARDIOLOGY | Facility: CLINIC | Age: 59
End: 2024-07-10
Payer: MEDICARE

## 2024-07-10 VITALS
DIASTOLIC BLOOD PRESSURE: 70 MMHG | OXYGEN SATURATION: 91 % | HEART RATE: 83 BPM | HEIGHT: 63 IN | SYSTOLIC BLOOD PRESSURE: 130 MMHG | BODY MASS INDEX: 29.06 KG/M2 | WEIGHT: 164 LBS

## 2024-07-10 DIAGNOSIS — I25.10 MILD CAD: ICD-10-CM

## 2024-07-10 DIAGNOSIS — E78.2 MIXED HYPERLIPIDEMIA: ICD-10-CM

## 2024-07-10 DIAGNOSIS — G47.33 OBSTRUCTIVE SLEEP APNEA: Primary | ICD-10-CM

## 2024-07-10 DIAGNOSIS — Z01.810 PREOP CARDIOVASCULAR EXAM: ICD-10-CM

## 2024-07-10 DIAGNOSIS — E11.9 TYPE 2 DIABETES MELLITUS WITHOUT COMPLICATION, WITHOUT LONG-TERM CURRENT USE OF INSULIN: ICD-10-CM

## 2024-07-10 NOTE — PROGRESS NOTES
[facility] Provider Note     Date:   07/10/2024   Name: Debbie Guzman  :   1965  PCP: Nicolas Duarte MD    Chief Complaint   Patient presents with    Sleep Apnea    Hypertension    Coronary Artery Disease       Subjective     History of Present Illness  Debbie Guzman is a 59 y.o. female who presents today for cardiac preop to have a cyst removed under her left armpit with Dr. Epps in [place] on 2024 at 11 AM.  She will also be having a colonoscopy next month.    Today she denies any chest pain.  Sometimes she has chronic shortness of air but she has not had any recently despite the heat outside.  No edema, palpitations, dizziness, or syncope.    She reports her machine, airflow, mask are comfortable and working well.[facility] is her DME.  PAP compliance report dated 2024 to 7/3/2024 download interpreted in clinic today.  Showing patient uses her machine 100% the time, over 4 hours 100% of the time, for an average use of 9 hours and 12 minutes.  Her overall AHI is 0.3 showing excellent compliance and excellent control.  Her max IPAP is 22, minimum EPAP 14, max pressure support 6, minimum pressure support 4, by flex setting of 3.  Patient is benefiting from PAP therapy.  Will plan to continue.  She will need KEZIA precautions for anesthesia.    We do need updated lipids on her.    We updated EKG today for preop clearance.  No changes.    [place] [facility] was requesting a copy of cardiac clearance, EKG from today, and they asked that we get patient's BMP and CMP so the patient would not have to drive back to [place] to get labs.  I have placed order for these.    Cardiac/sleep history  1.  Mild CAD-no previous stenting.  2.  KEZIA with baseline AHI of 24 on BiPAP therapy 22/14 cm  3.  Hyperlipidemia    DME: [facility]  [facility]    Coronary CTA 23-  1. Severe coronary calcification with an Agatston score = 307 using the  AJ-130 method, which represents 97 percentile when matched for age, gender and ethnicity.  Vascular age is 80 years.     2. Moderate stenosis in the proximal segment of the right coronary artery from noncalcified plaque. Otherwise nonobstructive epicardial coronary arteries       CT angio FFR 9/20/23- The moderate stenosis in the proximal segment of the right coronary artery has a Low likelihood of flow-limitation with an FFRct value of 0.94.    Nuclear stress test 8/15/2023-evidence of anterior apical moderate-sized area of ischemia.  Intermediate risk study.    Echocardiogram 8/8/2023-  ·  Left ventricular systolic function is normal. Calculated left ventricular EF = 64% Left ventricular ejection fraction appears to be 61 - 65%.  ·  Left ventricular wall thickness is consistent with mild to moderate concentric hypertrophy.  ·  Left ventricular diastolic function is consistent with (grade I) impaired relaxation.  ·  The right ventricular cavity is mildly dilated.  ·  There is a small (<1cm) pericardial effusion adjacent to the right atrium and right ventricle. There is no evidence of cardiac tamponade.      [facility] 8/27/2021-angiographically, the patient has mild coronary artery atherosclerosis.  There is no indication for revascularization.                                        Allergies   Allergen Reactions    Codeine Unknown - Low Severity       Current Outpatient Medications:     albuterol sulfate  (90 Base) MCG/ACT inhaler, Inhale 2 puffs., Disp: , Rfl:     aspirin 81 MG chewable tablet, Chew 1 tablet Daily., Disp: , Rfl:     atorvastatin (LIPITOR) 40 MG tablet, Take 1 tablet by mouth Daily., Disp: , Rfl:     cetirizine (zyrTEC) 10 MG tablet, Take 1 tablet every day by oral route as directed for 90 days., Disp: , Rfl:     cyclobenzaprine (FLEXERIL) 10 MG tablet, Take 1 tablet by mouth Daily., Disp: , Rfl:     cyclopentolate (CYCLOGYL) 1 % ophthalmic solution, PLEASE SEE ATTACHED FOR DETAILED DIRECTIONS,  "Disp: , Rfl:     diclofenac (VOLTAREN) 75 MG EC tablet, take 1 tablet by mouth twice daily with food for 30 day(s), Disp: , Rfl:     gabapentin (NEURONTIN) 600 MG tablet, , Disp: , Rfl:     gemfibrozil (LOPID) 600 MG tablet, Take 1 tablet by mouth Daily., Disp: , Rfl:     metFORMIN (GLUCOPHAGE) 500 MG tablet, Take 2 tablets by mouth Every 12 (Twelve) Hours., Disp: , Rfl:     omeprazole (priLOSEC) 40 MG capsule, Take 1 capsule by mouth Daily., Disp: , Rfl:     Ozempic, 0.25 or 0.5 MG/DOSE, 2 MG/3ML solution pen-injector, Inject 0.5 mg under the skin into the appropriate area as directed 1 (One) Time Per Week., Disp: , Rfl:     sertraline (ZOLOFT) 100 MG tablet, Take 1 tablet by mouth Daily., Disp: , Rfl:     Spiriva HandiHaler 18 MCG per inhalation capsule, Place  into inhaler and inhale Daily., Disp: , Rfl:     traZODone (DESYREL) 50 MG tablet, Take 1 tablet by mouth Every Night., Disp: , Rfl:     Past Medical History:   Diagnosis Date    COPD (chronic obstructive pulmonary disease)     Coronary artery disease     30% mid LAD per cath    Diabetes     Hyperlipidemia     Neuropathy     Sleep apnea     baseline AHI 24      Past Surgical History:   Procedure Laterality Date    BACK SURGERY      CERVICAL FUSION       SECTION      x two    CHOLECYSTECTOMY      HYSTERECTOMY      KNEE ARTHROSCOPY Bilateral     SHOULDER SURGERY Bilateral      No family history on file.  Social History     Socioeconomic History    Marital status: Single    Number of children: 2   Tobacco Use    Smoking status: Every Day     Current packs/day: 1.00     Average packs/day: 1 pack/day for 30.0 years (30.0 ttl pk-yrs)     Types: Cigarettes     Passive exposure: Current    Smokeless tobacco: Never   Vaping Use    Vaping status: Never Used   Substance and Sexual Activity    Alcohol use: Never    Drug use: Never    Sexual activity: Defer       Objective     Vital Signs:  /70   Pulse 83   Ht 160 cm (63\")   Wt 74.4 kg (164 lb)   " "SpO2 91%   BMI 29.05 kg/m²   Estimated body mass index is 29.05 kg/m² as calculated from the following:    Height as of this encounter: 160 cm (63\").    Weight as of this encounter: 74.4 kg (164 lb).         Physical Exam  Vitals reviewed.   Constitutional:       Appearance: Normal appearance. She is well-developed.   HENT:      Head: Normocephalic and atraumatic.   Eyes:      General: No scleral icterus.     Pupils: Pupils are equal, round, and reactive to light.   Cardiovascular:      Rate and Rhythm: Normal rate and regular rhythm.      Heart sounds: Normal heart sounds. No murmur heard.  Pulmonary:      Breath sounds: Normal breath sounds. No wheezing or rhonchi.   Musculoskeletal:         General: Normal range of motion.      Right lower leg: No edema.      Left lower leg: No edema.   Skin:     General: Skin is warm and dry.      Capillary Refill: Capillary refill takes less than 2 seconds.      Coloration: Skin is not cyanotic.      Nails: There is no clubbing.      Comments: Palpable, painful cyst under left arm   Neurological:      Mental Status: She is alert and oriented to person, place, and time.      Motor: No weakness.      Gait: Gait normal.   Psychiatric:         Mood and Affect: Mood normal.         Behavior: Behavior is cooperative.         Thought Content: Thought content normal.         Cognition and Memory: Memory normal.                 ECG 12 Lead    Date/Time: 7/11/2024 9:34 AM  Performed by: Leora Montgomery APRN    Authorized by: Leora Montgomery APRN  Comparison: compared with previous ECG from 8/3/2023  BPM: 74    Clinical impression: abnormal EKG  Comments: Possible right axis deviation.  Possible anterior myocardial infarction.  Old.  Abnormal EKG but stable from last 1           Assessment and Plan     Diagnoses and all orders for this visit:    1. Obstructive sleep apnea (Primary)  -     PAP Therapy    2. Mild CAD  Comments:  Stable.  No chest pain.  EKG " updated.  Orders:  -     Basic Metabolic Panel; Future  -     CBC & Differential; Future  -     Lipid Panel; Future  -     Lipid Panel  -     CBC & Differential  -     Basic Metabolic Panel    3. Mixed hyperlipidemia  Comments:  Update labs today.  Lifelong high-dose statin due to coexisting diabetes  Orders:  -     Basic Metabolic Panel; Future  -     CBC & Differential; Future  -     Lipid Panel; Future  -     Lipid Panel  -     CBC & Differential  -     Basic Metabolic Panel    4. Type 2 diabetes mellitus without complication, without long-term current use of insulin  Comments:  A1c was due.  Added A1c to labs.  Will forward to PCP  Orders:  -     Basic Metabolic Panel; Future  -     CBC & Differential; Future  -     Lipid Panel; Future  -     Hemoglobin A1c; Future  -     Hemoglobin A1c  -     Lipid Panel  -     CBC & Differential  -     Basic Metabolic Panel    5. Preop cardiovascular exam  Comments:  Acceptable cardiac risk.  Will need KEZIA precautions for anesthesia.    Other orders  -     ECG 12 Lead        Recommendations: ER if symptoms increase, Report if any new/changing symptoms immediately, Limitations of stress testing for definitive diagnosis reviewed, Limit salt, Limit caffeine, Exercise recommendations discussed, Discussed mediterranean diet, Sleep risks reviewed (driving, medical, sleep death, sedating agents), Sleep hygiene discussed, Increase pap therapy usage, Limitations of preop risk stratification reviewed, and KEZIA precautions for anesthesia      Follow Up  Return in about 6 months (around 1/10/2025) for Recheck.    Leora Montgomery, APRN   07/10/2024     Please note that this explicitly excludes time spent on other separate billable services such as performing procedures or test interpretation, when applicable.    This note was created using dictation software which occasionally transcribes nonsensical phrases. Please contact the provider if any clarification is needed.

## 2024-07-11 ENCOUNTER — TELEPHONE (OUTPATIENT)
Dept: CARDIOLOGY | Facility: CLINIC | Age: 59
End: 2024-07-11
Payer: MEDICARE

## 2024-07-11 PROBLEM — Z01.810 PREOP CARDIOVASCULAR EXAM: Status: ACTIVE | Noted: 2024-07-11

## 2024-07-11 NOTE — TELEPHONE ENCOUNTER
----- Message from Evelia WILKES sent at 7/11/2024  9:45 AM EDT -----  I HAVE SENT EVERYTHING REQUESTED.  ----- Message -----  From: Leora Montgomery APRN  Sent: 7/11/2024   9:39 AM EDT  To: Barbara Vance MA; #    Please scan and EKG and send today's note and recent labs to Dr. Epps.  Fax #[phone].  Patient is having surgery in the morning.  Also send my note and labs to PCP as well-Dr. Duarte.  Thanks

## 2024-07-11 NOTE — PROGRESS NOTES
Surgical Cardiac Risk Assessment    Patient Name: Debbie Guzman : 1965 MRN: [id number]    Procedure: Left underarm cyst removal  Scheduled Date: 2024  Scheduled Surgeon: Dr. Epps    Revised Cardiac Risk Index (RCRI)     Clinical Risk Factors  Check if Present or Past    History    High-risk type of surgery (examples include vascular surgery and any open intraperitoneal or intrathoracic procedure) [x]   +1 point     History of ischemic heart disease (history of myocardial infarction or a positive exercise test, current complaint of chest pain considered to be secondary to myocardial ischemia, use of nitrate therapy, or ECG with pathological Q waves; do not count prior coronary revascularization procedures unless one of the other criteria for ischemic heart disease is present) []   +1 point     History of heart failure []   +1 point     History of cerebrovascular disease []   +1 point     Diabetes mellitus requiring treatment with insulin   []   +1 point   Preoperative serum creatinine >2.0 mg/dL   (177 micromol/L)   []   +1 point       Rate of cardiac death, nonfatal myocardial infarction, and nonfatal cardiac arrest according to the number of predictors   Total Points       [] 0= 0.4% (95% CI 0.1-0.8)       [x] 1= 1.0% (95% CI 0.5-1.4)       [] 2= 2.4% (95% CI 1.3-3.5)       [] 3 or more= 5.4% (95% CI 2.8-7.9)         Rate of myocardial infarction, pulmonary edema, ventricular fibrillation, primary cardiac arrest, and complete heart block   Total Points       [] 0= 0.5% (95% CI 0.2-1.1)       [x] 1= 1.3% (95% CI 0.7-2.1)       [] 2= 3.6% (95% CI 2.1-5.6)       [] 3 or more= 9.1% (95% CI 5.5-13.8)     Recommendations    [x] Careful hemodynamic control (I.e, HR 60-70 bpm, no hypotension)  [x] If patient has been on beta blocker, continue throughout pre-operative period, if stable BP.  [x] Continue pre-op statin if no contraindications  [x] Hold ACE/ARB/Entresto 24 hours before and after if stable  BP.  [x] Avoid initiation of Clonidine pre-op.   [] Known KEZIA- Will need KEZIA precautions with anesthesia      Leora Montgomery, APRN  07/10/2024

## 2024-10-03 ENCOUNTER — TELEPHONE (OUTPATIENT)
Dept: CARDIOLOGY | Facility: CLINIC | Age: 59
End: 2024-10-03

## 2024-10-03 NOTE — TELEPHONE ENCOUNTER
Caller: Debbie Guzman    Relationship: Self    Best call back number: [phone]    What is the best time to reach you: ANY    What was the call regarding: ADVISING SHE HAS ANTH MEDICARE COVERAGE THAT WILL BE IN EFFECT ON 11.1.2024. WILL BRING THE CARD. PLEASE CALL THE ABOVE IF THERE ARE ANY QUESTIONS.     Is it okay if the provider responds through MyChart: NO

## 2024-10-30 ENCOUNTER — TELEPHONE (OUTPATIENT)
Dept: CARDIOLOGY | Facility: CLINIC | Age: 59
End: 2024-10-30

## 2024-10-30 NOTE — PROGRESS NOTES
Surgical Cardiac Risk Assessment     Patient Name: Debbie Guzman : 1965 MRN: [id number]     Procedure: Colonoscopy  Scheduled Date: 10/30/24  Revised Cardiac Risk Index (RCRI)      Clinical Risk Factors  Check if Present or Past    History    High-risk type of surgery (examples include vascular surgery and any open intraperitoneal or intrathoracic procedure) [x]   +1 point      History of ischemic heart disease (history of myocardial infarction or a positive exercise test, current complaint of chest pain considered to be secondary to myocardial ischemia, use of nitrate therapy, or ECG with pathological Q waves; do not count prior coronary revascularization procedures unless one of the other criteria for ischemic heart disease is present) []   +1 point      History of heart failure []   +1 point      History of cerebrovascular disease []   +1 point      Diabetes mellitus requiring treatment with insulin    []   +1 point   Preoperative serum creatinine >2.0 mg/dL   (177 micromol/L)    []   +1 point         Rate of cardiac death, nonfatal myocardial infarction, and nonfatal cardiac arrest according to the number of predictors    Total Points       [] 0= 0.4% (95% CI 0.1-0.8)       [x] 1= 1.0% (95% CI 0.5-1.4)       [] 2= 2.4% (95% CI 1.3-3.5)       [] 3 or more= 5.4% (95% CI 2.8-7.9)            Rate of myocardial infarction, pulmonary edema, ventricular fibrillation, primary cardiac arrest, and complete heart block    Total Points       [] 0= 0.5% (95% CI 0.2-1.1)       [x] 1= 1.3% (95% CI 0.7-2.1)       [] 2= 3.6% (95% CI 2.1-5.6)       [] 3 or more= 9.1% (95% CI 5.5-13.8)      Recommendations     [x] Careful hemodynamic control (I.e, HR 60-70 bpm, no hypotension)  [x] If patient has been on beta blocker, continue throughout pre-operative period, if stable BP.  [x] Continue pre-op statin if no contraindications  [x] Hold ACE/ARB/Entresto 24 hours before and after if stable BP.  [x] Avoid initiation of  Clonidine pre-op.   [] Known KEZIA- Will need KEZIA precautions with anesthesia     Leora Montgomery, APRN

## 2024-10-30 NOTE — TELEPHONE ENCOUNTER
REQUEST FOR CARDIAC CLEARANCE    Caller name: Debbie Guzman     Phone Number: [phone]     Surgeon's name: LORENA PEREZ     Type of planned surgery: colonoscopy    Date of planned surgery: 11/6/24    Type of anesthesia: GENERAL     Have you been experiencing chest pain or shortness of breath? NOT TO CALLERS KNOWLEDGE     Is your doctor requesting for you to stop any of your medications prior to your surgery? IF ON ANY BLOOD THINNERS- THINKS PT TAKES ASPIRIN, NEEDS HELD THE DAY OF IF SO     Where should we fax the clearance to? [phone]  ATTENTION [facility]ADE     IS SENDING CLEARANCE FORM, WILL BE SENT ON 10/31

## 2024-10-30 NOTE — TELEPHONE ENCOUNTER
Called and spoke to patient she is needing clearance as soon as she can but we don't take her insurance at this time. She is wanting to know if we can send lab orders and EKG to [facility] so she can get these done. Please advise.

## 2025-03-26 ENCOUNTER — TELEPHONE (OUTPATIENT)
Dept: CARDIOLOGY | Facility: CLINIC | Age: 60
End: 2025-03-26

## 2025-03-26 ENCOUNTER — OFFICE VISIT (OUTPATIENT)
Dept: CARDIOLOGY | Facility: CLINIC | Age: 60
End: 2025-03-26
Payer: COMMERCIAL

## 2025-03-26 VITALS
DIASTOLIC BLOOD PRESSURE: 66 MMHG | BODY MASS INDEX: 29.06 KG/M2 | WEIGHT: 164 LBS | HEIGHT: 63 IN | SYSTOLIC BLOOD PRESSURE: 116 MMHG | HEART RATE: 63 BPM | OXYGEN SATURATION: 98 %

## 2025-03-26 DIAGNOSIS — G47.33 OBSTRUCTIVE SLEEP APNEA: ICD-10-CM

## 2025-03-26 DIAGNOSIS — I25.10 MILD CAD: ICD-10-CM

## 2025-03-26 DIAGNOSIS — E78.2 MIXED HYPERLIPIDEMIA: ICD-10-CM

## 2025-03-26 PROCEDURE — 99213 OFFICE O/P EST LOW 20 MIN: CPT | Performed by: NURSE PRACTITIONER

## 2025-03-26 NOTE — PROGRESS NOTES
Date:   2025   Name: Debbie Guzman  :   1965  PCP: Nicolas Duarte MD  REF:    No ref. provider found     Sleep and/or Cardiology Consulting Provider Note         ..Sleep Apnea (6 month follow up. Pt states that her machine is working well and she has no complaints. Pt is moving to [place].) and Hypertension         History of Present Illness    Debbie Guzman is a 60 y.o. female who presents today for follow-up on KEZIA, hypertension, and CAD without cardiac stenting.  She will be moving to [place] the first week of .  I have encouraged her to get established with a new PCP, Cardiologist, and Sleep professional as soon as possible.      Today she denies any chest pain, SOA, edema, palpitations, dizziness, or syncope.  She feels her pap machine, airflow, and mask are comfortable and working well.    Her lipids are at goal and up to date.  Her EKG is up to date.    Can follow-up if needed; moving out of state in 9 weeks.    Cardiology and Sleep Related History:    Cardiac/sleep history  1.  Mild CAD-no previous stenting.    2.  KEZIA with baseline AHI of 24     Titration 16 Titrated to Bipap 24/18    Current settings:BiPAP therapy 22/14 cm with 2L O2 at night    3.  Hyperlipidemia    DME: [facility]    Coronary CTA 23-  1. Severe coronary calcification with an Agatston score = 307 using the AJ-130 method, which represents 97 percentile when matched for age, gender and ethnicity.  Vascular age is 80 years.     2. Moderate stenosis in the proximal segment of the right coronary artery from noncalcified plaque. Otherwise nonobstructive epicardial coronary arteries       CT angio FFR 23- The moderate stenosis in the proximal segment of the right coronary artery has a Low likelihood of flow-limitation with an FFRct value of 0.94.    Nuclear stress test 8/15/2023-evidence of anterior apical moderate-sized area of ischemia.  Intermediate risk  study.    Echocardiogram 8/8/2023-  ·  Left ventricular systolic function is normal. Calculated left ventricular EF = 64% Left ventricular ejection fraction appears to be 61 - 65%.  ·  Left ventricular wall thickness is consistent with mild to moderate concentric hypertrophy.  ·  Left ventricular diastolic function is consistent with (grade I) impaired relaxation.  ·  The right ventricular cavity is mildly dilated.  ·  There is a small (<1cm) pericardial effusion adjacent to the right atrium and right ventricle. There is no evidence of cardiac tamponade.      [facility] 8/27/2021-angiographically, the patient has mild coronary artery atherosclerosis.  There is no indication for revascularization.    There are no discontinued medications.               Allergies   Allergen Reactions    Codeine Unknown - Low Severity         Current Outpatient Medications:     albuterol sulfate  (90 Base) MCG/ACT inhaler, Inhale 2 puffs., Disp: , Rfl:     aspirin 81 MG chewable tablet, Chew 1 tablet Daily., Disp: , Rfl:     atorvastatin (LIPITOR) 40 MG tablet, Take 1 tablet by mouth Daily., Disp: , Rfl:     cetirizine (zyrTEC) 10 MG tablet, Take 1 tablet every day by oral route as directed for 90 days., Disp: , Rfl:     cyclobenzaprine (FLEXERIL) 10 MG tablet, Take 1 tablet by mouth 3 (Three) Times a Day As Needed., Disp: , Rfl:     cyclopentolate (CYCLOGYL) 1 % ophthalmic solution, PLEASE SEE ATTACHED FOR DETAILED DIRECTIONS, Disp: , Rfl:     gabapentin (NEURONTIN) 600 MG tablet, Take 1 tablet by mouth 3 (Three) Times a Day., Disp: , Rfl:     gemfibrozil (LOPID) 600 MG tablet, Take 1 tablet by mouth Daily., Disp: , Rfl:     metFORMIN (GLUCOPHAGE) 500 MG tablet, Take 2 tablets by mouth Every 12 (Twelve) Hours., Disp: , Rfl:     omeprazole (priLOSEC) 40 MG capsule, Take 1 capsule by mouth Daily., Disp: , Rfl:     Ozempic, 0.25 or 0.5 MG/DOSE, 2 MG/3ML solution pen-injector, Inject 0.5 mg under the skin into the appropriate area as  "directed 1 (One) Time Per Week., Disp: , Rfl:     sertraline (ZOLOFT) 100 MG tablet, Take 1 tablet by mouth Daily., Disp: , Rfl:     Spiriva HandiHaler 18 MCG per inhalation capsule, Place  into inhaler and inhale Daily., Disp: , Rfl:     traZODone (DESYREL) 50 MG tablet, Take 1 tablet by mouth Every Night., Disp: , Rfl:     diclofenac (VOLTAREN) 75 MG EC tablet, take 1 tablet by mouth twice daily with food for 30 day(s), Disp: , Rfl:     Past Medical History:   Diagnosis Date    Coronary artery disease 2011    30% mid LAD per cath    Diabetes     Hyperlipidemia     Neuropathy     Sleep apnea     baseline AHI 24          Patient Active Problem List   Diagnosis    Mild CAD    Obstructive sleep apnea    Shortness of breath    Abnormal nuclear stress test    Abnormal findings on diagnostic imaging of heart and coronary circulation    Anxiety disorder, unspecified    Dependence on supplemental oxygen    Depression, unspecified    Long term (current) use of insulin    Chronic obstructive pulmonary disease, unspecified    Nicotine dependence, cigarettes, uncomplicated    Presence of left artificial hip joint    Type 2 diabetes mellitus without complications    Elevated blood pressure reading    Hyperlipidemia    Preop cardiovascular exam        History reviewed. No pertinent family history.      family history is not on file.     Social History     Socioeconomic History    Marital status: Single    Number of children: 2   Tobacco Use    Smoking status: Every Day     Current packs/day: 1.00     Average packs/day: 1 pack/day for 30.0 years (30.0 ttl pk-yrs)     Types: Cigarettes     Passive exposure: Current    Smokeless tobacco: Never   Vaping Use    Vaping status: Never Used   Substance and Sexual Activity    Alcohol use: Never    Drug use: Never    Sexual activity: Defer             Vital Signs:  /66 (BP Location: Right arm, Patient Position: Sitting, Cuff Size: Adult)   Pulse 63   Ht 160 cm (63\")   Wt 74.4 kg " "(164 lb)   SpO2 98%   BMI 29.05 kg/m²   Vitals:    03/26/25 0945   Patient Position: Sitting      Estimated body mass index is 29.05 kg/m² as calculated from the following:    Height as of this encounter: 160 cm (63\").    Weight as of this encounter: 74.4 kg (164 lb).     Physical Exam  Vitals reviewed.   Constitutional:       Appearance: Normal appearance. She is well-developed.   HENT:      Head: Normocephalic and atraumatic.   Eyes:      General: No scleral icterus.     Pupils: Pupils are equal, round, and reactive to light.   Cardiovascular:      Rate and Rhythm: Normal rate and regular rhythm.      Heart sounds: Normal heart sounds. No murmur heard.  Pulmonary:      Breath sounds: Normal breath sounds. No wheezing or rhonchi.   Musculoskeletal:         General: Normal range of motion.      Right lower leg: No edema.      Left lower leg: No edema.   Skin:     General: Skin is warm and dry.      Capillary Refill: Capillary refill takes less than 2 seconds.      Coloration: Skin is not cyanotic.      Nails: There is no clubbing.   Neurological:      Mental Status: She is alert and oriented to person, place, and time.      Motor: No weakness.      Gait: Gait normal.   Psychiatric:         Mood and Affect: Mood normal.         Behavior: Behavior is cooperative.         Thought Content: Thought content normal.         Cognition and Memory: Memory normal.               Assessment and Plan     Diagnoses and all orders for this visit:    1. Obstructive sleep apnea  Assessment & Plan:  Patient is doing very well on BIPAP therapy with good control and compliance.  Download was reviewed at last office visit.  She is benefiting from PAP therapy and we will continue at current settings.       Orders:  -     PAP Therapy    2. Mild CAD  Assessment & Plan:  -Stable. No chest pain.  -EKG and labs are up to date      3. Mixed hyperlipidemia  Assessment & Plan:   -At goal  -Continue meds              Recommendations: ER if " symptoms increase, Report if any new/changing symptoms immediately, Limit salt, Stop cigarettes, Sleep risks reviewed (driving, medical, sleep death, sedating agents), Sleep hygiene discussed, and KEZIA precautions for anesthesia    Follow Up  Return for Can follow-up if needed; moving out of state in 9 weeks..    Leora Montgomery, APRN   03/26/2025     Please note that this explicitly excludes time spent on other separate billable services such as performing procedures or test interpretation, when applicable.  This note was created using dictation software which occasionally transcribes nonsensical phrases. Please contact the provider if any clarification is needed.
